# Patient Record
Sex: MALE | Race: WHITE
[De-identification: names, ages, dates, MRNs, and addresses within clinical notes are randomized per-mention and may not be internally consistent; named-entity substitution may affect disease eponyms.]

---

## 2017-01-31 ENCOUNTER — HOSPITAL ENCOUNTER (OUTPATIENT)
Dept: HOSPITAL 53 - M WUC | Age: 27
End: 2017-01-31
Attending: FAMILY MEDICINE
Payer: COMMERCIAL

## 2017-01-31 DIAGNOSIS — L85.3: Primary | ICD-10-CM

## 2017-01-31 LAB
BASOPHILS # BLD AUTO: 0.1 K/MM3 (ref 0–0.2)
BASOPHILS NFR BLD AUTO: 1.1 % (ref 0–1)
EOSINOPHIL # BLD AUTO: 0.2 K/MM3 (ref 0–0.5)
EOSINOPHIL NFR BLD AUTO: 2.4 % (ref 0–3)
ERYTHROCYTE [DISTWIDTH] IN BLOOD BY AUTOMATED COUNT: 12.6 % (ref 11.5–14.5)
LARGE UNSTAINED CELL #: 0.2 K/MM3 (ref 0–0.4)
LARGE UNSTAINED CELL %: 2.9 % (ref 0–4)
LYMPHOCYTES # BLD AUTO: 2.7 K/MM3 (ref 1.5–6.5)
LYMPHOCYTES NFR BLD AUTO: 35.6 % (ref 24–44)
MCH RBC QN AUTO: 31.5 PG (ref 27–33)
MCHC RBC AUTO-ENTMCNC: 35 G/DL (ref 32–36.5)
MCV RBC AUTO: 89.9 FL (ref 80–96)
MONOCYTES # BLD AUTO: 0.6 K/MM3 (ref 0–0.8)
MONOCYTES NFR BLD AUTO: 7.9 % (ref 0–5)
NEUTROPHILS # BLD AUTO: 3.8 K/MM3 (ref 1.8–7.7)
NEUTROPHILS NFR BLD AUTO: 50.1 % (ref 36–66)
PLATELET # BLD AUTO: 310 K/MM3 (ref 150–450)
T4 FREE SERPL-MCNC: 0.96 NG/DL (ref 0.76–1.46)
WBC # BLD AUTO: 7.6 K/MM3 (ref 4–10)

## 2017-06-15 ENCOUNTER — HOSPITAL ENCOUNTER (OUTPATIENT)
Dept: HOSPITAL 53 - M WUC | Age: 27
End: 2017-06-15
Attending: FAMILY MEDICINE
Payer: COMMERCIAL

## 2017-06-15 DIAGNOSIS — M79.643: Primary | ICD-10-CM

## 2017-09-13 ENCOUNTER — HOSPITAL ENCOUNTER (OUTPATIENT)
Dept: HOSPITAL 53 - M LAB | Age: 27
End: 2017-09-13
Attending: INTERNAL MEDICINE
Payer: COMMERCIAL

## 2017-09-13 DIAGNOSIS — R53.83: ICD-10-CM

## 2017-09-13 DIAGNOSIS — Z79.899: ICD-10-CM

## 2017-09-13 DIAGNOSIS — M35.9: Primary | ICD-10-CM

## 2017-09-13 LAB
ALBUMIN SERPL BCG-MCNC: 3.8 GM/DL (ref 3.2–5.2)
ALBUMIN/GLOB SERPL: 1.19 {RATIO} (ref 1–1.93)
ALP SERPL-CCNC: 92 U/L (ref 45–117)
ALT SERPL W P-5'-P-CCNC: 117 U/L (ref 12–78)
ANION GAP SERPL CALC-SCNC: 8 MEQ/L (ref 8–16)
AST SERPL-CCNC: 70 U/L (ref 15–37)
BASOPHILS # BLD AUTO: 0.1 K/MM3 (ref 0–0.2)
BASOPHILS NFR BLD AUTO: 1.1 % (ref 0–1)
BILIRUB SERPL-MCNC: 0.4 MG/DL (ref 0.2–1)
BUN SERPL-MCNC: 7 MG/DL (ref 7–18)
CALCIUM SERPL-MCNC: 9 MG/DL (ref 8.5–10.1)
CHLORIDE SERPL-SCNC: 106 MEQ/L (ref 98–107)
CO2 SERPL-SCNC: 28 MEQ/L (ref 21–32)
CREAT SERPL-MCNC: 1.1 MG/DL (ref 0.7–1.3)
EOSINOPHIL # BLD AUTO: 0.2 K/MM3 (ref 0–0.5)
EOSINOPHIL NFR BLD AUTO: 3 % (ref 0–3)
ERYTHROCYTE [DISTWIDTH] IN BLOOD BY AUTOMATED COUNT: 12.3 % (ref 11.5–14.5)
ERYTHROCYTE [SEDIMENTATION RATE] IN BLOOD BY WESTERGREN METHOD: 6 MM/HR (ref 0–15)
GFR SERPL CREATININE-BSD FRML MDRD: > 60 ML/MIN/{1.73_M2} (ref 60–?)
GLUCOSE SERPL-MCNC: 192 MG/DL (ref 70–105)
LARGE UNSTAINED CELL #: 0.2 K/MM3 (ref 0–0.4)
LARGE UNSTAINED CELL %: 3.1 % (ref 0–4)
LYMPHOCYTES # BLD AUTO: 1.9 K/MM3 (ref 1.5–6.5)
LYMPHOCYTES NFR BLD AUTO: 33 % (ref 24–44)
MCH RBC QN AUTO: 31.8 PG (ref 27–33)
MCHC RBC AUTO-ENTMCNC: 36 G/DL (ref 32–36.5)
MCV RBC AUTO: 88.3 FL (ref 80–96)
MONOCYTES # BLD AUTO: 0.6 K/MM3 (ref 0–0.8)
MONOCYTES NFR BLD AUTO: 9.8 % (ref 0–5)
NEUTROPHILS # BLD AUTO: 2.9 K/MM3 (ref 1.8–7.7)
NEUTROPHILS NFR BLD AUTO: 50 % (ref 36–66)
PLATELET # BLD AUTO: 235 K/MM3 (ref 150–450)
POTASSIUM SERPL-SCNC: 3.7 MEQ/L (ref 3.5–5.1)
PROT SERPL-MCNC: 7 GM/DL (ref 6.4–8.2)
SODIUM SERPL-SCNC: 142 MEQ/L (ref 136–145)
WBC # BLD AUTO: 5.7 K/MM3 (ref 4–10)

## 2017-09-15 LAB
B BURGDOR IGG+IGM SER-ACNC: <0.91 ISR (ref 0–0.9)
B BURGDOR IGM SER IA-ACNC: <0.8 INDEX (ref 0–0.79)

## 2017-09-21 ENCOUNTER — HOSPITAL ENCOUNTER (OUTPATIENT)
Dept: HOSPITAL 53 - M RAD | Age: 27
End: 2017-09-21
Attending: INTERNAL MEDICINE
Payer: COMMERCIAL

## 2017-09-21 DIAGNOSIS — M79.672: ICD-10-CM

## 2017-09-21 DIAGNOSIS — M25.542: ICD-10-CM

## 2017-09-21 DIAGNOSIS — M54.5: Primary | ICD-10-CM

## 2017-09-21 DIAGNOSIS — M25.541: ICD-10-CM

## 2017-09-21 DIAGNOSIS — M85.571: ICD-10-CM

## 2017-09-21 DIAGNOSIS — M79.671: ICD-10-CM

## 2017-09-21 NOTE — REP
Sacroiliac joint series four views:

 

The sacroiliac articulations are not fused.  There is no cortical eburnation or

erosion.  Mineralization is normal.  The sacral ala and foramen are

unremarkable.

 

Impression:

 

Negative sacroiliac joint series.

 

 

Signed by

Vinny Dumont MD 09/21/2017 02:27 P

## 2017-09-21 NOTE — REP
Right foot four views :

 

There is no fracture or dislocation.

 

Mineralization and joint spaces are normal.

 

There are no calcifications or foreign bodies.

 

Impression:

 

Negative right foot.  There is a bone cyst in the head of the great toe proximal

phalange .

 

 

Signed by

Vinny Dumont MD 09/21/2017 02:29 P

## 2017-09-21 NOTE — REP
Lumbar spine five views:

 

Vertebral body heights, interspacing alignment are normal.  There is no

spondylolysis or spondylolisthesis.  The pedicles, facets and sacroiliac

articulations are unremarkable.

 

Impression:

 

Negative lumbar spine.

 

 

Signed by

Vinny Dumont MD 09/21/2017 02:08 P

## 2017-09-21 NOTE — REP
Right hand four views :

 

There is no fracture or dislocation.

 

Mineralization and joint spaces are normal.

 

There are no calcifications or foreign bodies.

 

Impression:

 

Negative right hand .

 

 

Signed by

Vinny Dumont MD 09/21/2017 02:28 P

## 2018-01-30 ENCOUNTER — HOSPITAL ENCOUNTER (OUTPATIENT)
Dept: HOSPITAL 53 - M LAB REF | Age: 28
End: 2018-01-30
Attending: FAMILY MEDICINE
Payer: COMMERCIAL

## 2018-01-30 DIAGNOSIS — Z11.3: Primary | ICD-10-CM

## 2018-01-30 LAB
ALBUMIN/GLOBULIN RATIO: 1.35 (ref 1–1.93)
ALBUMIN: 4.2 GM/DL (ref 3.2–5.2)
ALKALINE PHOSPHATASE: 105 U/L (ref 45–117)
ALT SERPL W P-5'-P-CCNC: 72 U/L (ref 12–78)
ANION GAP: 11 MEQ/L (ref 8–16)
AST SERPL-CCNC: 57 U/L (ref 7–37)
BILIRUBIN,TOTAL: 0.8 MG/DL (ref 0.2–1)
BLOOD UREA NITROGEN: 7 MG/DL (ref 7–18)
CALCIUM LEVEL: 8.6 MG/DL (ref 8.5–10.1)
CARBON DIOXIDE LEVEL: 24 MEQ/L (ref 21–32)
CHLAMYDIA DNA AMPLIFICATION: NEGATIVE
CHLORIDE LEVEL: 105 MEQ/L (ref 98–107)
CHOLEST SERPL-MCNC: 187 MG/DL (ref ?–200)
CHOLESTEROL RISK RATIO: 7.48 (ref ?–5)
CREATININE FOR GFR: 1.07 MG/DL (ref 0.7–1.3)
GC DNA AMPLIFICATION: NEGATIVE
GFR SERPL CREATININE-BSD FRML MDRD: > 60 ML/MIN/{1.73_M2} (ref 60–?)
GLUCOSE, FASTING: 115 MG/DL (ref 70–100)
HDLC SERPL-MCNC: 25 MG/DL (ref 40–?)
NONHDLC SERPL-MCNC: 162 MG/DL
POTASSIUM SERUM: 3.8 MEQ/L (ref 3.5–5.1)
SODIUM LEVEL: 140 MEQ/L (ref 136–145)
THYROID STIMULATING HORMONE: 3.31 UIU/ML (ref 0.36–3.74)
TOTAL PROTEIN: 7.3 GM/DL (ref 6.4–8.2)
TRIGLYCERIDES LEVEL: 468 MG/DL (ref ?–150)

## 2018-01-31 LAB
HCV AB SER QL: 0 INDEX (ref ?–0.8)
HEPATITIS A ANTIBODY IGM: NEGATIVE
HEPATITIS B CORE ANTIBODY IGM: NEGATIVE
HEPATITIS B SURFACE ANTIGEN: NEGATIVE
HIV 1&2 SCREEN CENTAUR: NEGATIVE
SYPHILIS: NONREACTIVE

## 2018-02-19 ENCOUNTER — HOSPITAL ENCOUNTER (OUTPATIENT)
Dept: HOSPITAL 53 - M SLEEP HO | Age: 28
End: 2018-02-19
Attending: FAMILY MEDICINE
Payer: COMMERCIAL

## 2018-02-19 DIAGNOSIS — G47.19: Primary | ICD-10-CM

## 2018-07-11 ENCOUNTER — HOSPITAL ENCOUNTER (EMERGENCY)
Dept: HOSPITAL 53 - M ED | Age: 28
Discharge: HOME | End: 2018-07-11
Payer: COMMERCIAL

## 2018-07-11 DIAGNOSIS — I10: ICD-10-CM

## 2018-07-11 DIAGNOSIS — R73.03: Primary | ICD-10-CM

## 2018-07-11 DIAGNOSIS — K76.0: ICD-10-CM

## 2018-07-11 DIAGNOSIS — F32.9: ICD-10-CM

## 2018-07-11 DIAGNOSIS — F41.9: ICD-10-CM

## 2018-07-11 DIAGNOSIS — Z79.899: ICD-10-CM

## 2018-07-11 LAB
ALBUMIN/GLOBULIN RATIO: 1.53 (ref 1–1.93)
ALBUMIN: 4.6 GM/DL (ref 3.2–5.2)
ALKALINE PHOSPHATASE: 93 U/L (ref 45–117)
ALT SERPL W P-5'-P-CCNC: 93 U/L (ref 12–78)
ANION GAP: 10 MEQ/L (ref 8–16)
AST SERPL-CCNC: 61 U/L (ref 7–37)
BASO #: 0.1 10^3/UL (ref 0–0.2)
BASO %: 1 % (ref 0–1)
BILIRUB CONJ SERPL-MCNC: 0.2 MG/DL (ref 0–0.2)
BILIRUBIN,TOTAL: 0.7 MG/DL (ref 0.2–1)
BLOOD UREA NITROGEN: 7 MG/DL (ref 7–18)
CALCIUM LEVEL: 8.9 MG/DL (ref 8.5–10.1)
CARBON DIOXIDE LEVEL: 25 MEQ/L (ref 21–32)
CHLORIDE LEVEL: 105 MEQ/L (ref 98–107)
CK MB CFR.DF SERPL CALC: 1.04
CK SERPL-CCNC: 96 U/L (ref 39–308)
CK-MB VALUE MASS: < 1 NG/ML (ref ?–3.6)
CREATININE FOR GFR: 1.06 MG/DL (ref 0.7–1.3)
EOS #: 0.1 10^3/UL (ref 0–0.5)
EOSINOPHIL NFR BLD AUTO: 1.4 % (ref 0–3)
EST. AVERAGE GLUCOSE BLD GHB EST-MCNC: 126 MG/DL (ref 60–110)
FREE T4: 1.02 NG/DL (ref 0.76–1.46)
GFR SERPL CREATININE-BSD FRML MDRD: > 60 ML/MIN/{1.73_M2} (ref 60–?)
GLUCOSE, FASTING: 111 MG/DL (ref 70–100)
HEMATOCRIT: 45.9 % (ref 42–52)
HEMOGLOBIN: 16.7 G/DL (ref 13.5–17.5)
IMMATURE GRANULOCYTE %: 0.1 % (ref 0–3)
LEUKOCYTE ESTERASE UR AUTO RFX: (no result)
LIPASE: 116 U/L (ref 73–393)
LYMPH #: 2.1 10^3/UL (ref 1.5–6.5)
LYMPH %: 29.6 % (ref 24–44)
MAGNESIUM LEVEL: 2.1 MG/DL (ref 1.8–2.4)
MEAN CORPUSCULAR HEMOGLOBIN: 32.4 PG (ref 27–33)
MEAN CORPUSCULAR HGB CONC: 36.4 G/DL (ref 32–36.5)
MEAN CORPUSCULAR VOLUME: 89 FL (ref 80–96)
MONO #: 0.7 10^3/UL (ref 0–0.8)
MONO %: 9.6 % (ref 0–5)
NEUTROPHILS #: 4.1 10^3/UL (ref 1.8–7.7)
NEUTROPHILS %: 58.3 % (ref 36–66)
NRBC BLD AUTO-RTO: 0 % (ref 0–0)
NT-PRO BNP: 53 PG/ML (ref ?–125)
PLATELET COUNT, AUTOMATED: 261 10^3/UL (ref 150–450)
POTASSIUM SERUM: 3.9 MEQ/L (ref 3.5–5.1)
RED BLOOD COUNT: 5.16 10^6/UL (ref 4.3–6.1)
RED CELL DISTRIBUTION WIDTH: 12.2 % (ref 11.5–14.5)
SODIUM LEVEL: 140 MEQ/L (ref 136–145)
SPECIFIC GRAVITY UR AUTO RFX: 1 (ref 1–1.03)
SQUAM EPITHELIAL CELL UR AURFX: 0 /HPF (ref 0–6)
THYROID STIMULATING HORMONE: 2.57 UIU/ML (ref 0.36–3.74)
TOTAL PROTEIN: 7.6 GM/DL (ref 6.4–8.2)
TROPONIN I: < 0.02 NG/ML (ref ?–0.1)
WHITE BLOOD COUNT: 7.1 10^3/UL (ref 4–10)

## 2018-07-11 PROCEDURE — 76705 ECHO EXAM OF ABDOMEN: CPT
